# Patient Record
Sex: FEMALE | URBAN - METROPOLITAN AREA
[De-identification: names, ages, dates, MRNs, and addresses within clinical notes are randomized per-mention and may not be internally consistent; named-entity substitution may affect disease eponyms.]

---

## 2017-06-26 ENCOUNTER — APPOINTMENT (RX ONLY)
Dept: URBAN - METROPOLITAN AREA CLINIC 15 | Facility: CLINIC | Age: 47
Setting detail: DERMATOLOGY
End: 2017-06-26

## 2017-06-26 DIAGNOSIS — Z41.9 ENCOUNTER FOR PROCEDURE FOR PURPOSES OTHER THAN REMEDYING HEALTH STATE, UNSPECIFIED: ICD-10-CM

## 2017-06-26 PROCEDURE — ? ADDITIONAL NOTES

## 2017-06-26 NOTE — PROCEDURE: ADDITIONAL NOTES
Additional Notes: Patient states hair loss for the 10 years, exacerbating in the last 5 years. She had treatment before in [place] with mesotherapy, minoxidil and birth control pills. \\nRight now only using Minoxidil 5%. \\n\\nWith diagnostic impression of Androgenetic alopecia and Seborrheic dermatitis I suggested a dermatological and endocrinological evaluation, Zinc shampoo (2-3 times per week) and continue with Minoxidil 5% at night, Zinc and Biotin PO.